# Patient Record
Sex: FEMALE | Race: WHITE | NOT HISPANIC OR LATINO | Employment: FULL TIME | URBAN - METROPOLITAN AREA
[De-identification: names, ages, dates, MRNs, and addresses within clinical notes are randomized per-mention and may not be internally consistent; named-entity substitution may affect disease eponyms.]

---

## 2023-04-13 ENCOUNTER — TELEMEDICINE (OUTPATIENT)
Dept: PRIMARY CARE | Facility: CLINIC | Age: 28
End: 2023-04-13
Payer: COMMERCIAL

## 2023-04-13 DIAGNOSIS — F90.2 ADHD (ATTENTION DEFICIT HYPERACTIVITY DISORDER), COMBINED TYPE: ICD-10-CM

## 2023-04-13 DIAGNOSIS — J31.0 CHRONIC RHINITIS: ICD-10-CM

## 2023-04-13 DIAGNOSIS — F41.1 GENERALIZED ANXIETY DISORDER: ICD-10-CM

## 2023-04-13 DIAGNOSIS — M26.609 TMJ DISEASE: ICD-10-CM

## 2023-04-13 DIAGNOSIS — N93.8 DUB (DYSFUNCTIONAL UTERINE BLEEDING): Primary | ICD-10-CM

## 2023-04-13 PROCEDURE — 99214 OFFICE O/P EST MOD 30 MIN: CPT | Performed by: FAMILY MEDICINE

## 2023-04-14 RX ORDER — PROPRANOLOL HYDROCHLORIDE 20 MG/1
20 TABLET ORAL 3 TIMES DAILY
COMMUNITY
End: 2023-04-15 | Stop reason: SDUPTHER

## 2023-04-14 RX ORDER — FLUTICASONE PROPIONATE 50 MCG
2 SPRAY, SUSPENSION (ML) NASAL DAILY
COMMUNITY
Start: 2015-06-22

## 2023-04-14 RX ORDER — CLONAZEPAM 1 MG/1
1 TABLET ORAL EVERY 12 HOURS PRN
COMMUNITY
End: 2023-04-15 | Stop reason: SDUPTHER

## 2023-04-14 RX ORDER — DEXTROAMPHETAMINE SACCHARATE, AMPHETAMINE ASPARTATE MONOHYDRATE, DEXTROAMPHETAMINE SULFATE AND AMPHETAMINE SULFATE 3.75; 3.75; 3.75; 3.75 MG/1; MG/1; MG/1; MG/1
15 CAPSULE, EXTENDED RELEASE ORAL
COMMUNITY
Start: 2023-01-23 | End: 2023-04-15 | Stop reason: ALTCHOICE

## 2023-04-14 RX ORDER — BUPROPION HYDROCHLORIDE 150 MG/1
150 TABLET ORAL
COMMUNITY
Start: 2017-07-24 | End: 2023-04-15 | Stop reason: ALTCHOICE

## 2023-04-14 RX ORDER — ETONOGESTREL AND ETHINYL ESTRADIOL .12; .015 MG/D; MG/D
1 RING VAGINAL
COMMUNITY
End: 2023-04-15 | Stop reason: SDUPTHER

## 2023-04-14 RX ORDER — NORGESTIMATE AND ETHINYL ESTRADIOL 0.25-0.035
1 KIT ORAL
COMMUNITY
Start: 2019-06-14 | End: 2023-04-15 | Stop reason: ALTCHOICE

## 2023-04-14 RX ORDER — ESCITALOPRAM OXALATE 10 MG/1
10 TABLET ORAL NIGHTLY
COMMUNITY
Start: 2023-01-23 | End: 2023-04-15 | Stop reason: ALTCHOICE

## 2023-04-14 NOTE — PROGRESS NOTES
Subjective   Patient ID: Evelina Mason is a 27 y.o. female who presents for No chief complaint on file..  HPI  See cc  Review of Systems  12 Systems have been reviewed as follows.  Constitutional: Fever, weight gain, weight loss, appetite change, night sweats, fatigue, chills.  Eyes : blurry, double vision, vision, loss, tearing, redness, pain, sensitivity to light, glaucoma.  Ears, nose, mouth, and throat: Hearing loss, ringing in the ears, ear pain, nasal congestion, nasal drainage, nosebleeds, mouth, throat, irritation tooth problem.  Cardiovascular :chest pain, pressure, heart racing, palpitations, sweating, leg swelling, high or low blood pressure  Pulmonary: Cough, yellow or green sputum, blood and sputum, shortness of breath, wheezing  Gastrointestinal: Nausea, vomiting, diarrhea, constipation, pain, blood in stool, or vomitus, heartburn, difficulty swallowing  Genitourinary: incontinence, abnormal bleeding, abnormal discharge, urinary frequency, urinary hesitancy, pain, impotence sexual problem, infection, urinary retention  Musculoskeletal: Pain, stiffness, joint, redness or warmth, arthritis, back pain, weakness, muscle wasting, sprain or fracture  Neuro: Weight weakness, dizziness, change in voice, change in taste change in vision, change in hearing, loss, or change of sensation, trouble walking, balance problems coordination problems, shaking, speech problem  Endocrine , cold or heat intolerance, blood sugar problem, weight gain or loss missed periods hot flashes, sweats, change in body hair, change in libido, increased thirst, increased urination  Heme/lymph: Swelling, bleeding, problem anemia, bruising, enlarged lymph nodes  Allergic/immunologic: H. plus nasal drip, watery itchy eyes, nasal drainage, immunosuppressed  The above were reviewed and noted negative except as noted in HPI and Problem List.    Objective   Physical Exam  Constitutional: Well developed, well nourished, alert and in no acute  distress   There were no vitals taken for this visit.  Lab Results   Component Value Date    WBC 5.4 08/02/2022    HGB 13.2 08/02/2022    HCT 39.6 08/02/2022    MCV 90 08/02/2022     08/02/2022       Assessment/Plan   Problem List Items Addressed This Visit          Musculoskeletal    TMJ disease    Relevant Orders    Follow Up In [facility] - PCP       Infectious/Inflammatory    Rhinitis    Relevant Orders    Follow Up In [facility] - PCP       Other    ADHD (attention deficit hyperactivity disorder), combined type    Relevant Orders    Follow Up In [facility] - PCP    Anxiety disorder    Relevant Medications    propranolol (Inderal) 20 mg tablet    clonazePAM (KlonoPIN) 1 mg tablet    Other Relevant Orders    Follow Up In [facility] - PCP    Follow Up In [facility] - [facility]     Other Visit Diagnoses       DUB (dysfunctional uterine bleeding)    -  Primary    Relevant Medications    EluRyng 0.12-0.015 mg/24 hr vaginal ring    Other Relevant Orders    Follow Up In [facility] - PCP        UDS next    MV every day with Fe    Patient's use of medication is allowing patient to be able to perform ADL's. Patient is always being evaluated for the possibility of lowering the medication dosage.    I have personally reviewed the OARRS report with the patient and have considered the risk of abuse, addiction, dependence and diversion.      Calcium every day    Virtual Visit - Audio and Visual Communication Real Time

## 2023-04-15 PROBLEM — M26.609 TMJ DISEASE: Status: ACTIVE | Noted: 2020-04-16

## 2023-04-15 PROBLEM — F41.9 ANXIETY DISORDER: Status: ACTIVE | Noted: 2023-04-15

## 2023-04-15 PROBLEM — L70.9 ACNE: Status: ACTIVE | Noted: 2023-04-15

## 2023-04-15 PROBLEM — J34.2 DEVIATED SEPTUM: Status: ACTIVE | Noted: 2020-04-16

## 2023-04-15 PROBLEM — Z86.69 HISTORY OF OBSTRUCTIVE SLEEP APNEA: Status: ACTIVE | Noted: 2023-04-15

## 2023-04-15 PROBLEM — J31.0 RHINITIS: Status: ACTIVE | Noted: 2020-04-16

## 2023-04-15 PROBLEM — F90.2 ADHD (ATTENTION DEFICIT HYPERACTIVITY DISORDER), COMBINED TYPE: Status: ACTIVE | Noted: 2017-07-24

## 2023-04-15 PROBLEM — D22.9 MULTIPLE NEVI: Status: ACTIVE | Noted: 2023-04-15

## 2023-04-15 PROBLEM — M25.50 ARTHRALGIA: Status: ACTIVE | Noted: 2023-04-15

## 2023-04-15 RX ORDER — PROPRANOLOL HYDROCHLORIDE 20 MG/1
20 TABLET ORAL 3 TIMES DAILY
Qty: 50 TABLET | Refills: 1 | Status: SHIPPED | OUTPATIENT
Start: 2023-04-15

## 2023-04-15 RX ORDER — ETONOGESTREL AND ETHINYL ESTRADIOL .12; .015 MG/D; MG/D
1 RING VAGINAL
Qty: 3 EACH | Refills: 1 | Status: SHIPPED | OUTPATIENT
Start: 2023-04-15

## 2023-04-15 RX ORDER — CLONAZEPAM 1 MG/1
1 TABLET ORAL EVERY 12 HOURS PRN
Qty: 20 TABLET | Refills: 0 | Status: SHIPPED | OUTPATIENT
Start: 2023-04-15 | End: 2023-05-26 | Stop reason: SDUPTHER

## 2023-05-02 ENCOUNTER — TELEPHONE (OUTPATIENT)
Dept: PRIMARY CARE | Facility: CLINIC | Age: 28
End: 2023-05-02

## 2023-05-02 NOTE — TELEPHONE ENCOUNTER
Please inform patient that her Pre-op labs will be ordered at the scheduled pre op visit. Labs will not be able to be completed more than 1 week before the scheduled procedure date.    Performing physician must order COVID swab 24-48 hours before scheduled procedure date.

## 2023-05-02 NOTE — TELEPHONE ENCOUNTER
PT HAS A PRE OP APPT 06/01 AND IS REQUESTING LAB ORDERS TO BE PUT IN PRIOR TO THIS SO SHE CAN GO GET THEM DONE. SHE WILL ALSO NEED A COVID TEST.  PLEASE INFORM PT WHEN ORDERS ARE DONE

## 2023-05-26 ENCOUNTER — OFFICE VISIT (OUTPATIENT)
Dept: PRIMARY CARE | Facility: CLINIC | Age: 28
End: 2023-05-26
Payer: COMMERCIAL

## 2023-05-26 ENCOUNTER — LAB (OUTPATIENT)
Dept: LAB | Facility: LAB | Age: 28
End: 2023-05-26
Payer: COMMERCIAL

## 2023-05-26 VITALS
BODY MASS INDEX: 21.28 KG/M2 | HEIGHT: 64 IN | DIASTOLIC BLOOD PRESSURE: 78 MMHG | OXYGEN SATURATION: 99 % | SYSTOLIC BLOOD PRESSURE: 110 MMHG | HEART RATE: 72 BPM

## 2023-05-26 DIAGNOSIS — F41.1 GENERALIZED ANXIETY DISORDER: ICD-10-CM

## 2023-05-26 DIAGNOSIS — Z00.00 ANNUAL PHYSICAL EXAM: ICD-10-CM

## 2023-05-26 DIAGNOSIS — J34.2 DEVIATED SEPTUM: ICD-10-CM

## 2023-05-26 DIAGNOSIS — N93.8 DUB (DYSFUNCTIONAL UTERINE BLEEDING): ICD-10-CM

## 2023-05-26 DIAGNOSIS — M26.609 TMJ DISEASE: ICD-10-CM

## 2023-05-26 DIAGNOSIS — Z86.69 HISTORY OF OBSTRUCTIVE SLEEP APNEA: Primary | ICD-10-CM

## 2023-05-26 DIAGNOSIS — Z01.818 PRE-OP EXAM: ICD-10-CM

## 2023-05-26 DIAGNOSIS — J31.0 CHRONIC RHINITIS: ICD-10-CM

## 2023-05-26 DIAGNOSIS — R35.0 INCREASED URINARY FREQUENCY: ICD-10-CM

## 2023-05-26 DIAGNOSIS — Z79.899 MEDICATION MANAGEMENT: ICD-10-CM

## 2023-05-26 DIAGNOSIS — F90.2 ADHD (ATTENTION DEFICIT HYPERACTIVITY DISORDER), COMBINED TYPE: ICD-10-CM

## 2023-05-26 LAB
ACTIVATED PARTIAL THROMBOPLASTIN TIME IN PPP BY COAGULATION ASSAY: 27 SEC (ref 26–39)
ALANINE AMINOTRANSFERASE (SGPT) (U/L) IN SER/PLAS: 12 U/L (ref 7–45)
ALBUMIN (G/DL) IN SER/PLAS: 4.4 G/DL (ref 3.4–5)
ALKALINE PHOSPHATASE (U/L) IN SER/PLAS: 31 U/L (ref 33–110)
ANION GAP IN SER/PLAS: 15 MMOL/L (ref 10–20)
ASPARTATE AMINOTRANSFERASE (SGOT) (U/L) IN SER/PLAS: 15 U/L (ref 9–39)
BILIRUBIN TOTAL (MG/DL) IN SER/PLAS: 0.8 MG/DL (ref 0–1.2)
CALCIDIOL (25 OH VITAMIN D3) (NG/ML) IN SER/PLAS: 32 NG/ML
CALCIUM (MG/DL) IN SER/PLAS: 9.5 MG/DL (ref 8.6–10.3)
CARBON DIOXIDE, TOTAL (MMOL/L) IN SER/PLAS: 24 MMOL/L (ref 21–32)
CHLORIDE (MMOL/L) IN SER/PLAS: 103 MMOL/L (ref 98–107)
CHOLESTEROL (MG/DL) IN SER/PLAS: 224 MG/DL (ref 0–199)
CHOLESTEROL IN HDL (MG/DL) IN SER/PLAS: 82 MG/DL
CHOLESTEROL/HDL RATIO: 2.7
CREATININE (MG/DL) IN SER/PLAS: 0.79 MG/DL (ref 0.5–1.05)
ERYTHROCYTE DISTRIBUTION WIDTH (RATIO) BY AUTOMATED COUNT: 11.8 % (ref 11.5–14.5)
ERYTHROCYTE MEAN CORPUSCULAR HEMOGLOBIN CONCENTRATION (G/DL) BY AUTOMATED: 34 G/DL (ref 32–36)
ERYTHROCYTE MEAN CORPUSCULAR VOLUME (FL) BY AUTOMATED COUNT: 89 FL (ref 80–100)
ERYTHROCYTES (10*6/UL) IN BLOOD BY AUTOMATED COUNT: 4.23 X10E12/L (ref 4–5.2)
GFR FEMALE: >90 ML/MIN/1.73M2
GLUCOSE (MG/DL) IN SER/PLAS: 72 MG/DL (ref 74–99)
HEMATOCRIT (%) IN BLOOD BY AUTOMATED COUNT: 37.7 % (ref 36–46)
HEMOGLOBIN (G/DL) IN BLOOD: 12.8 G/DL (ref 12–16)
INR IN PPP BY COAGULATION ASSAY: 1 (ref 0.9–1.1)
LDL: 126 MG/DL (ref 0–99)
LEUKOCYTES (10*3/UL) IN BLOOD BY AUTOMATED COUNT: 6.1 X10E9/L (ref 4.4–11.3)
PLATELETS (10*3/UL) IN BLOOD AUTOMATED COUNT: 255 X10E9/L (ref 150–450)
POC APPEARANCE, URINE: CLEAR
POC BILIRUBIN, URINE: NEGATIVE
POC BLOOD, URINE: NEGATIVE
POC COLOR, URINE: YELLOW
POC GLUCOSE, URINE: NEGATIVE MG/DL
POC KETONES, URINE: ABNORMAL MG/DL
POC LEUKOCYTES, URINE: NEGATIVE
POC NITRITE,URINE: NEGATIVE
POC PH, URINE: 7 PH
POC PROTEIN, URINE: NEGATIVE MG/DL
POC SPECIFIC GRAVITY, URINE: 1.02
POC UROBILINOGEN, URINE: 0.2 EU/DL
POTASSIUM (MMOL/L) IN SER/PLAS: 4.3 MMOL/L (ref 3.5–5.3)
PROTEIN TOTAL: 7 G/DL (ref 6.4–8.2)
PROTHROMBIN TIME (PT) IN PPP BY COAGULATION ASSAY: 11.3 SEC (ref 9.8–13.4)
SODIUM (MMOL/L) IN SER/PLAS: 138 MMOL/L (ref 136–145)
THYROTROPIN (MIU/L) IN SER/PLAS BY DETECTION LIMIT <= 0.05 MIU/L: 0.97 MIU/L (ref 0.44–3.98)
TRIGLYCERIDE (MG/DL) IN SER/PLAS: 82 MG/DL (ref 0–149)
UREA NITROGEN (MG/DL) IN SER/PLAS: 13 MG/DL (ref 6–23)
VLDL: 16 MG/DL (ref 0–40)

## 2023-05-26 PROCEDURE — 87086 URINE CULTURE/COLONY COUNT: CPT

## 2023-05-26 PROCEDURE — 80358 DRUG SCREENING METHADONE: CPT

## 2023-05-26 PROCEDURE — 85610 PROTHROMBIN TIME: CPT

## 2023-05-26 PROCEDURE — 85027 COMPLETE CBC AUTOMATED: CPT

## 2023-05-26 PROCEDURE — 82306 VITAMIN D 25 HYDROXY: CPT

## 2023-05-26 PROCEDURE — 81002 URINALYSIS NONAUTO W/O SCOPE: CPT | Performed by: FAMILY MEDICINE

## 2023-05-26 PROCEDURE — 36415 COLL VENOUS BLD VENIPUNCTURE: CPT

## 2023-05-26 PROCEDURE — 80373 DRUG SCREENING TRAMADOL: CPT

## 2023-05-26 PROCEDURE — 80324 DRUG SCREEN AMPHETAMINES 1/2: CPT

## 2023-05-26 PROCEDURE — 80346 BENZODIAZEPINES1-12: CPT

## 2023-05-26 PROCEDURE — 87081 CULTURE SCREEN ONLY: CPT

## 2023-05-26 PROCEDURE — 84443 ASSAY THYROID STIM HORMONE: CPT

## 2023-05-26 PROCEDURE — 80368 SEDATIVE HYPNOTICS: CPT

## 2023-05-26 PROCEDURE — 80061 LIPID PANEL: CPT

## 2023-05-26 PROCEDURE — 80361 OPIATES 1 OR MORE: CPT

## 2023-05-26 PROCEDURE — 80365 DRUG SCREENING OXYCODONE: CPT

## 2023-05-26 PROCEDURE — 80354 DRUG SCREENING FENTANYL: CPT

## 2023-05-26 PROCEDURE — 80053 COMPREHEN METABOLIC PANEL: CPT

## 2023-05-26 PROCEDURE — 85730 THROMBOPLASTIN TIME PARTIAL: CPT

## 2023-05-26 PROCEDURE — 80307 DRUG TEST PRSMV CHEM ANLYZR: CPT

## 2023-05-26 PROCEDURE — 99214 OFFICE O/P EST MOD 30 MIN: CPT | Performed by: FAMILY MEDICINE

## 2023-05-26 RX ORDER — CLONAZEPAM 1 MG/1
1 TABLET ORAL EVERY 12 HOURS PRN
Qty: 20 TABLET | Refills: 0 | Status: SHIPPED | OUTPATIENT
Start: 2023-05-26

## 2023-05-26 RX ORDER — DEXTROAMPHETAMINE SACCHARATE, AMPHETAMINE ASPARTATE MONOHYDRATE, DEXTROAMPHETAMINE SULFATE AND AMPHETAMINE SULFATE 3.75; 3.75; 3.75; 3.75 MG/1; MG/1; MG/1; MG/1
15 CAPSULE, EXTENDED RELEASE ORAL EVERY MORNING
COMMUNITY

## 2023-05-26 ASSESSMENT — ENCOUNTER SYMPTOMS
GASTROINTESTINAL NEGATIVE: 1
MUSCULOSKELETAL NEGATIVE: 1
CONSTITUTIONAL NEGATIVE: 1
RESPIRATORY NEGATIVE: 1
PSYCHIATRIC NEGATIVE: 1
ALLERGIC/IMMUNOLOGIC NEGATIVE: 1
CARDIOVASCULAR NEGATIVE: 1
HEMATOLOGIC/LYMPHATIC NEGATIVE: 1
NEUROLOGICAL NEGATIVE: 1
EYES NEGATIVE: 1
ENDOCRINE NEGATIVE: 1

## 2023-05-26 NOTE — PROGRESS NOTES
"Subjective   Patient ID: Evelina Mason is a 27 y.o. female who presents for a follow up on deviated septum and anxiety.     HPI Pt getting septoplasty. Pt is hoping to get in next Saturday but if not will be getting it Aug 23rd. Pt states that it is the same surgeon that did her jaw surgery.     Pt seeing psychiatry and is getting adderall ER 15mg from them.     Pt would like a refill on klonopin. It is working well for her. UDS left today      Review of Systems   Constitutional: Negative.    HENT: Negative.     Eyes: Negative.    Respiratory: Negative.     Cardiovascular: Negative.    Gastrointestinal: Negative.    Endocrine: Negative.    Genitourinary: Negative.    Musculoskeletal: Negative.    Skin: Negative.    Allergic/Immunologic: Negative.    Neurological: Negative.    Hematological: Negative.    Psychiatric/Behavioral: Negative.         Objective   /78   Pulse 72   Ht 1.626 m (5' 4\")   SpO2 99%   BMI 21.28 kg/m²     Physical Exam   Constitutional: Well developed, well nourished, alert and in no acute distress   Eyes: Normal external exam. Pupils equally round and reactive to light with normal accommodation and extraocular movements intact.  Neck: Supple, no lymphadenopathy or masses.   Cardiovascular: Regular rate and rhythm, normal S1 and S2, no murmurs, gallops, or rubs. Radial pulses normal. No peripheral edema.  Pulmonary: No respiratory distress, lungs clear to auscultation bilaterally. No wheezes, rhonchi, rales.  Abdomen: soft,non tender, non distended, without masses or HSM  Skin: Warm, well perfused, normal skin turgor and color.   Neurologic: Cranial nerves II-XII grossly intact.   Psychiatric: Mood calm and affect normal  Musculoskeletal: Moving all extremities without restriction    Assessment/Plan   Problem List Items Addressed This Visit          Musculoskeletal    TMJ disease    Relevant Orders    Follow Up In [facility] - PCP       Infectious/Inflammatory    Rhinitis "    Relevant Orders    Follow Up In [facility] - PCP       Other    ADHD (attention deficit hyperactivity disorder), combined type    Relevant Orders    Follow Up In [facility] - PCP    Anxiety disorder    Relevant Medications    clonazePAM (KlonoPIN) 1 mg tablet    Other Relevant Orders    Follow Up In [facility] - PCP    Deviated septum    Relevant Orders    Follow Up In [facility] - PCP    History of obstructive sleep apnea - Primary     Other Visit Diagnoses       DUB (dysfunctional uterine bleeding)        Relevant Orders    Follow Up In [facility] - PCP    Annual physical exam        Relevant Orders    CBC (Completed)    Comprehensive Metabolic Panel (Completed)    Lipid Panel (Completed)    Thyroid Stimulating Hormone (Completed)    Vitamin D 25-Hydroxy,Total (Completed)    Coagulation Screen (Completed)    Follow Up In [facility] - PCP    POCT UA (nonautomated) manually resulted (Completed)    Pre-op exam        Relevant Orders    Staphylococcus/MRSA Colonization, Culture (Completed)    Medication management        Relevant Orders    Opiate/Opioid/Benzo Extended Prescription Compliance (Completed)    Amphetamine Confirm, Urine (Completed)    Increased urinary frequency        Relevant Orders    Urine Culture (Completed)          Scribe Attestation  By signing my name below, I, Pete Carson MD   , Scribe   attest that this documentation has been prepared under the direction and in the presence of Pete Carson MD.    Provider Attestation - Scribe documentation    All medical record entries made by the Scribe were at my direction and personally dictated by me. I have reviewed the chart and agree that the record accurately reflects my personal performance of the history, physical exam, discussion and plan.    Calcium every day       UDS today      MV every day with Fe    I have personally reviewed the OARRS report with the patient and  have considered the risk of abuse, addiction, dependence and diversion.    Order sleep study in future    Patient is having septoplasty with  Dr. Ku possibly 6/2 if not will be on 8/23     Fax results to [phone]    Medically cleared    [facility] repeat lipid panel 3 months

## 2023-05-28 LAB
STAPH/MRSA SCREEN, CULTURE: NORMAL
URINE CULTURE: NORMAL

## 2023-06-01 ENCOUNTER — APPOINTMENT (OUTPATIENT)
Dept: PRIMARY CARE | Facility: CLINIC | Age: 28
End: 2023-06-01

## 2023-06-01 LAB
6-ACETYLMORPHINE: <25 NG/ML
7-AMINOCLONAZEPAM: 107 NG/ML
ALPHA-HYDROXYALPRAZOLAM: <25 NG/ML
ALPHA-HYDROXYMIDAZOLAM: <25 NG/ML
ALPRAZOLAM: <25 NG/ML
AMPHETAMINE (PRESENCE) IN URINE BY SCREEN METHOD: ABNORMAL
BARBITURATES PRESENCE IN URINE BY SCREEN METHOD: ABNORMAL
CANNABINOIDS IN URINE BY SCREEN METHOD: ABNORMAL
CHLORDIAZEPOXIDE: <25 NG/ML
CLONAZEPAM: <25 NG/ML
COCAINE (PRESENCE) IN URINE BY SCREEN METHOD: ABNORMAL
CODEINE: <50 NG/ML
CREATINE, URINE FOR DRUG: 129.4 MG/DL
DIAZEPAM: <25 NG/ML
DRUG SCREEN COMMENT URINE: ABNORMAL
EDDP: <25 NG/ML
FENTANYL CONFIRMATION, URINE: <2.5 NG/ML
HYDROCODONE: <25 NG/ML
HYDROMORPHONE: <25 NG/ML
LORAZEPAM: <25 NG/ML
METHADONE CONFIRMATION,URINE: <25 NG/ML
MIDAZOLAM: <25 NG/ML
MORPHINE URINE: <50 NG/ML
NORDIAZEPAM: <25 NG/ML
NORFENTANYL: <2.5 NG/ML
NORHYDROCODONE: <25 NG/ML
NOROXYCODONE: <25 NG/ML
O-DESMETHYLTRAMADOL: <50 NG/ML
OXAZEPAM: <25 NG/ML
OXYCODONE: <25 NG/ML
OXYMORPHONE: <25 NG/ML
PHENCYCLIDINE (PRESENCE) IN URINE BY SCREEN METHOD: ABNORMAL
TEMAZEPAM: <25 NG/ML
TRAMADOL: <50 NG/ML
ZOLPIDEM METABOLITE (ZCA): <25 NG/ML
ZOLPIDEM: <25 NG/ML

## 2023-06-03 LAB
AMPHETAMINES,URINE: 1372 NG/ML
MDA,URINE: <200 NG/ML
MDEA,URINE: <200 NG/ML
MDMA,UR: <200 NG/ML
METHAMPHETAMINE QUANTITATIVE URINE: <200 NG/ML
PHENTERMINE,UR: <200 NG/ML

## 2023-07-13 ENCOUNTER — TELEMEDICINE (OUTPATIENT)
Dept: PRIMARY CARE | Facility: CLINIC | Age: 28
End: 2023-07-13
Payer: COMMERCIAL

## 2023-07-13 DIAGNOSIS — L01.00 IMPETIGO: Primary | ICD-10-CM

## 2023-07-13 PROCEDURE — 99213 OFFICE O/P EST LOW 20 MIN: CPT | Performed by: NURSE PRACTITIONER

## 2023-07-13 RX ORDER — MUPIROCIN 20 MG/G
OINTMENT TOPICAL 3 TIMES DAILY
Qty: 22 G | Refills: 0 | Status: SHIPPED | OUTPATIENT
Start: 2023-07-13 | End: 2023-07-23

## 2023-07-13 ASSESSMENT — ENCOUNTER SYMPTOMS
MYALGIAS: 0
CHILLS: 0
FEVER: 0
ARTHRALGIAS: 0

## 2023-07-13 NOTE — PATIENT INSTRUCTIONS
Hx and exam are c/w bacterial skin infection, will treat.   We talked about the typical course as well as symptoms that need re-evaluation in person - ie increased redness, swelling, pain, fullness, etc.  Use topical med as directed.

## 2023-07-13 NOTE — PROGRESS NOTES
As noted, pulled hair from face, now crusty and red with scant serous drainage.  Pictures reviewed.Subjective   Patient ID: Evelina Mason is a 27 y.o. female who presents for skin issue.  See ml  Feels well otherwise        Review of Systems   Constitutional:  Negative for chills and fever.   Musculoskeletal:  Negative for arthralgias and myalgias.   Skin:         Per CC       Objective   Physical Exam  Constitutional:       Appearance: Normal appearance.   Musculoskeletal:      Cervical back: Neck supple.   Skin:     Comments: Birthmark right lateral chin contains area of redness, crusty drainage   Neurological:      Mental Status: She is alert.         Assessment/Plan

## 2023-11-07 ENCOUNTER — HOSPITAL ENCOUNTER (OUTPATIENT)
Dept: RADIOLOGY | Facility: HOSPITAL | Age: 28
Discharge: HOME | End: 2023-11-07
Payer: COMMERCIAL

## 2023-11-07 ENCOUNTER — OFFICE VISIT (OUTPATIENT)
Dept: ORTHOPEDIC SURGERY | Facility: HOSPITAL | Age: 28
End: 2023-11-07
Payer: COMMERCIAL

## 2023-11-07 VITALS — BODY MASS INDEX: 18.78 KG/M2 | HEIGHT: 64 IN | WEIGHT: 110 LBS

## 2023-11-07 DIAGNOSIS — M25.562 PAIN IN BOTH KNEES, UNSPECIFIED CHRONICITY: ICD-10-CM

## 2023-11-07 DIAGNOSIS — M25.561 PAIN IN BOTH KNEES, UNSPECIFIED CHRONICITY: ICD-10-CM

## 2023-11-07 DIAGNOSIS — S83.241A ACUTE MEDIAL MENISCUS TEAR OF RIGHT KNEE, INITIAL ENCOUNTER: Primary | ICD-10-CM

## 2023-11-07 PROCEDURE — 73564 X-RAY EXAM KNEE 4 OR MORE: CPT | Mod: RIGHT SIDE | Performed by: RADIOLOGY

## 2023-11-07 PROCEDURE — 99213 OFFICE O/P EST LOW 20 MIN: CPT | Mod: GC,25 | Performed by: STUDENT IN AN ORGANIZED HEALTH CARE EDUCATION/TRAINING PROGRAM

## 2023-11-07 PROCEDURE — 1036F TOBACCO NON-USER: CPT | Performed by: STUDENT IN AN ORGANIZED HEALTH CARE EDUCATION/TRAINING PROGRAM

## 2023-11-07 PROCEDURE — 99203 OFFICE O/P NEW LOW 30 MIN: CPT | Performed by: STUDENT IN AN ORGANIZED HEALTH CARE EDUCATION/TRAINING PROGRAM

## 2023-11-07 PROCEDURE — 73564 X-RAY EXAM KNEE 4 OR MORE: CPT | Mod: RT

## 2023-11-07 ASSESSMENT — ENCOUNTER SYMPTOMS
ARTHRALGIAS: 1
FATIGUE: 0
BACK PAIN: 0
WEAKNESS: 0
NUMBNESS: 0
ACTIVITY CHANGE: 0
FEVER: 0
JOINT SWELLING: 0
COLOR CHANGE: 1

## 2023-11-07 ASSESSMENT — PAIN DESCRIPTION - DESCRIPTORS: DESCRIPTORS: THROBBING;PRESSURE;ACHING

## 2023-11-07 ASSESSMENT — PAIN - FUNCTIONAL ASSESSMENT: PAIN_FUNCTIONAL_ASSESSMENT: 0-10

## 2023-11-07 ASSESSMENT — PAIN SCALES - GENERAL: PAINLEVEL_OUTOF10: 8

## 2023-11-07 NOTE — PROGRESS NOTES
REFERRAL SOURCE: No ref. provider found     CHIEF COMPLAINT: right knee pain    HISTORY OF PRESENT ILLNESS  Evelina Mason is a very pleasant 28 y.o. female with history of ADHD, Anxiety, and TSERING who is here for evaluation of right knee pain.    11/7/23: On the morning of November 6, 2023 she was out walking her dog when it bolted after a squirrel causing her to fall to the ground.  She fell directly on her knee does not remember if the knee twisted or not as it happened so fast.  She immediately felt pain along with a pop.  She was able to get up and walk on it but could not fully extend the knee.  She tried ice and heat at home which did not really help and the knee swelled had a bruise formed.  She feels like the knee is unstable when she walks around.  Pain is located anteriorly and feels very sharp.  She rates her pain a 4 out of 10 constantly with flares up to 10 out of 10.    MEDS    Current Outpatient Medications:     amphetamine-dextroamphetamine XR (Adderall XR) 15 mg 24 hr capsule, Take 1 capsule (15 mg) by mouth once daily in the morning. Do not crush or chew., Disp: , Rfl:     clonazePAM (KlonoPIN) 1 mg tablet, Take 1 tablet (1 mg) by mouth every 12 hours if needed for anxiety., Disp: 20 tablet, Rfl: 0    EluRyng 0.12-0.015 mg/24 hr vaginal ring, Insert 1 each into the vagina every 28 (twenty-eight) days., Disp: 3 each, Rfl: 1    fluticasone (Flonase) 50 mcg/actuation nasal spray, Administer 2 sprays into each nostril once daily., Disp: , Rfl:     propranolol (Inderal) 20 mg tablet, Take 1 tablet (20 mg) by mouth in the morning and 1 tablet (20 mg) in the evening and 1 tablet (20 mg) before bedtime., Disp: 50 tablet, Rfl: 1    ALLERGIES  No Known Allergies    PAST MEDICAL HISTORY  Past Medical History:   Diagnosis Date    Arthralgia of temporomandibular joint, unspecified side     TMJ syndrome       PAST SURGICAL HISTORY  Past Surgical History:   Procedure Laterality Date    OTHER SURGICAL HISTORY   05/21/2021    No history of surgery       SOCIAL HISTORY   Social History     Socioeconomic History    Marital status: Single     Spouse name: Not on file    Number of children: Not on file    Years of education: Not on file    Highest education level: Not on file   Occupational History    Not on file   Tobacco Use    Smoking status: Unknown    Smokeless tobacco: Not on file   Substance and Sexual Activity    Alcohol use: Not on file    Drug use: Not on file    Sexual activity: Not on file   Other Topics Concern    Not on file   Social History Narrative    Not on file     Social Determinants of Health     Financial Resource Strain: Not on file   Food Insecurity: Not on file   Transportation Needs: Not on file   Physical Activity: Not on file   Stress: Not on file   Social Connections: Not on file   Intimate Partner Violence: Not on file   Housing Stability: Not on file       FAMILY HISTORY  No family history on file.    REVIEW OF SYSTEMS  Except for those mentioned in the history of present illness, and below, a complete review of systems is negative.     Review of Systems   Constitutional:  Negative for activity change, fatigue and fever.   Musculoskeletal:  Positive for arthralgias (right knee pian). Negative for back pain and joint swelling.   Skin:  Positive for color change (ecchymosis over medial knee). Negative for rash.   Neurological:  Negative for weakness and numbness.       VITALS  There were no vitals filed for this visit.    PHYSICAL EXAMINATION   GENERAL:  Awake, alert, and oriented, no apparent distress, pleasant, and cooperative  PSYC: Mood is euthymic, affect is congruent  EAR, NOSE, THROAT:  Normocephalic, atraumatic, moist membranes, anicteric sclera  LUNG: Nonlabored breathing  HEART: No clubbing or cyanosis  SKIN: No increased erythema, warmth, rashes, or concerning skin lesions  NEURO: Sensation is intact in the bilateral lower extremities. Strength is grossly 5 out of 5 throughout the  bilateral lower extremities, unless noted below.  GAIT: Antalgic; not able to fully extend the knee.  MUSCULOSKELETAL: Examination of the right knee: Range of motion is 5-120. Mild effusion. No patellar apprehension. TTP to medial and lateral joint line, medial tibial plateau, medial patellar facet, quad tendon, and patellar tendon. Varus and valgus stress test are negative. Lachman's is negative. Posterior drawer is negative. Kolton's and meniscal grind tests are positive.     IMAGING STUDIES: Radiographs the right knee dated 11/7/2023 were personally reviewed and interpreted by me, Dr. Anjali Christopher, and the findings shared with the patient.  No evidence of fracture.  There is a suprapatellar effusion.     IMPRESSION  #1  Acute right knee pain and swelling with positive Kolton's concerning for meniscal pathology    PLAN  The following was discussed with the patient:     Evelina Mason is a very pleasant 28 y.o. female  with history of ADHD, Anxiety, and TSERING who is here for evaluation of right knee pain.  She has positive Kolton's concerning for meniscal pathology.  She also has tenderness palpation over the medial joint line and medial tibial plateau, which could be related to occult fracture given her mechanism of a fall.  She also has pain over the medial hamstring tendons, which are likely overcompensating following the initial injury.  -We discussed the above.  -We will obtain an MRI to rule out meniscal injury given that she has instability and inability to fully extend her knee.  -She can ambulate as tolerated and should utilize crutches if pain is worsening.  -Can take Tylenol over-the-counter and utilize ice.  -Follow-up after the MRI of the right knee.    The patient was counseled to remain active, but avoid activities that worsen symptoms. The patient was in agreement with this plan. All questions were answered to the best of my ability.    PATIENT EDUCATION:  Education was discussed at  today's appointment. A learning needs assessment was performed.    Primary learner: Evelina Mason  Barriers to learning: None  Preferred language: English  Learning preferences include: Seeing and doing.  Discussed: Diagnosis and treatment plan.  Demonstrated: Understanding of material discussed.  Patient education materials given: None.  Learner response: Learner demonstrated understanding.    This note was dictated using Dragon speech recognition software and was not corrected for spelling or grammatical errors.      Patient seen and examined with PM&R resident, Dr. Butler. History, physical examination, pertinent imaging findings and the plan of care were discussed and I performed the key portions of the history, physical examination, and discussion of the plan of care. I have edited his note and agree with the findings.      Anjali Christopher MD    [facility] [place]   and [facility]

## 2023-11-15 ENCOUNTER — HOSPITAL ENCOUNTER (OUTPATIENT)
Dept: RADIOLOGY | Facility: HOSPITAL | Age: 28
Discharge: HOME | End: 2023-11-15
Payer: COMMERCIAL

## 2023-11-15 ENCOUNTER — APPOINTMENT (OUTPATIENT)
Dept: RADIOLOGY | Facility: HOSPITAL | Age: 28
End: 2023-11-15
Payer: COMMERCIAL

## 2023-11-15 DIAGNOSIS — S83.241A ACUTE MEDIAL MENISCUS TEAR OF RIGHT KNEE, INITIAL ENCOUNTER: ICD-10-CM

## 2023-11-15 PROCEDURE — 73721 MRI JNT OF LWR EXTRE W/O DYE: CPT | Mod: RIGHT SIDE | Performed by: RADIOLOGY

## 2023-11-15 PROCEDURE — 73721 MRI JNT OF LWR EXTRE W/O DYE: CPT | Mod: RT

## 2023-12-18 ENCOUNTER — APPOINTMENT (OUTPATIENT)
Dept: ORTHOPEDIC SURGERY | Facility: CLINIC | Age: 28
End: 2023-12-18

## 2023-12-21 ENCOUNTER — OFFICE VISIT (OUTPATIENT)
Dept: ORTHOPEDIC SURGERY | Facility: CLINIC | Age: 28
End: 2023-12-21
Payer: COMMERCIAL

## 2023-12-21 DIAGNOSIS — T14.8XXA BONE BRUISE: Primary | ICD-10-CM

## 2023-12-21 DIAGNOSIS — M22.2X1 PATELLOFEMORAL PAIN SYNDROME OF RIGHT KNEE: ICD-10-CM

## 2023-12-21 PROCEDURE — 1036F TOBACCO NON-USER: CPT | Performed by: STUDENT IN AN ORGANIZED HEALTH CARE EDUCATION/TRAINING PROGRAM

## 2023-12-21 PROCEDURE — 99213 OFFICE O/P EST LOW 20 MIN: CPT | Performed by: STUDENT IN AN ORGANIZED HEALTH CARE EDUCATION/TRAINING PROGRAM

## 2023-12-21 ASSESSMENT — ENCOUNTER SYMPTOMS
COLOR CHANGE: 0
FEVER: 0
ARTHRALGIAS: 1
ACTIVITY CHANGE: 0
FATIGUE: 0
JOINT SWELLING: 0
BACK PAIN: 0
NUMBNESS: 0
WEAKNESS: 0

## 2023-12-21 NOTE — PROGRESS NOTES
REFERRAL SOURCE: No ref. provider found     CHIEF COMPLAINT: right knee pain    HISTORY OF PRESENT ILLNESS  Evelina Mason is a very pleasant 28 y.o. female with history of ADHD, Anxiety, and TSERING who is here for evaluation of right knee pain.    Previous History:  11/7/23: On the morning of November 6, 2023 she was out walking her dog when it bolted after a squirrel causing her to fall to the ground.  She fell directly on her knee does not remember if the knee twisted or not as it happened so fast.  She immediately felt pain along with a pop.  She was able to get up and walk on it but could not fully extend the knee.  She tried ice and heat at home which did not really help and the knee swelled had a bruise formed.  She feels like the knee is unstable when she walks around.  Pain is located anteriorly and feels very sharp.  She rates her pain a 4 out of 10 constantly with flares up to 10 out of 10.    Interval History:  12/21/2023: Since the last visit, her pain has improved slightly but still has pain around her right knee anteriorly. This is worse when she flexes her knee and also has to kneel down. She has been trying to rest her knee when she can and avoid activities that would make it worse. We discussed her MRI today.     MEDS    Current Outpatient Medications:     amphetamine-dextroamphetamine XR (Adderall XR) 15 mg 24 hr capsule, Take 1 capsule (15 mg) by mouth once daily in the morning. Do not crush or chew., Disp: , Rfl:     clonazePAM (KlonoPIN) 1 mg tablet, Take 1 tablet (1 mg) by mouth every 12 hours if needed for anxiety., Disp: 20 tablet, Rfl: 0    EluRyng 0.12-0.015 mg/24 hr vaginal ring, Insert 1 each into the vagina every 28 (twenty-eight) days., Disp: 3 each, Rfl: 1    fluticasone (Flonase) 50 mcg/actuation nasal spray, Administer 2 sprays into each nostril once daily., Disp: , Rfl:     propranolol (Inderal) 20 mg tablet, Take 1 tablet (20 mg) by mouth in the morning and 1 tablet (20 mg) in the  evening and 1 tablet (20 mg) before bedtime. (Patient not taking: Reported on 11/7/2023), Disp: 50 tablet, Rfl: 1    ALLERGIES  No Known Allergies    PAST MEDICAL HISTORY  Past Medical History:   Diagnosis Date    Arthralgia of temporomandibular joint, unspecified side     TMJ syndrome       PAST SURGICAL HISTORY  Past Surgical History:   Procedure Laterality Date    OTHER SURGICAL HISTORY  05/21/2021    No history of surgery       SOCIAL HISTORY   Social History     Socioeconomic History    Marital status: Single     Spouse name: Not on file    Number of children: Not on file    Years of education: Not on file    Highest education level: Not on file   Occupational History    Not on file   Tobacco Use    Smoking status: Never    Smokeless tobacco: Never   Substance and Sexual Activity    Alcohol use: Yes     Alcohol/week: 2.0 standard drinks of alcohol     Types: 2 Glasses of wine per week     Comment: social    Drug use: Never    Sexual activity: Not on file   Other Topics Concern    Not on file   Social History Narrative    Not on file     Social Determinants of Health     Financial Resource Strain: Not on file   Food Insecurity: Not on file   Transportation Needs: Not on file   Physical Activity: Not on file   Stress: Not on file   Social Connections: Not on file   Intimate Partner Violence: Not on file   Housing Stability: Not on file       FAMILY HISTORY  Mother has Rheumatoid Arthritis and osteoporosis    REVIEW OF SYSTEMS  Except for those mentioned in the history of present illness, and below, a complete review of systems is negative.     Review of Systems   Constitutional:  Negative for activity change, fatigue and fever.   Musculoskeletal:  Positive for arthralgias (right knee pian). Negative for back pain and joint swelling.   Skin:  Negative for color change (ecchymosis over medial knee) and rash.   Neurological:  Negative for weakness and numbness.       VITALS  There were no vitals filed for this  visit.    PHYSICAL EXAMINATION   GENERAL:  Awake, alert, and oriented, no apparent distress, pleasant, and cooperative  PSYC: Mood is euthymic, affect is congruent  EAR, NOSE, THROAT:  Normocephalic, atraumatic, moist membranes, anicteric sclera  LUNG: Nonlabored breathing  HEART: No clubbing or cyanosis  SKIN: No increased erythema, warmth, rashes, or concerning skin lesions  NEURO: Sensation is intact in the bilateral lower extremities. Strength is grossly 5 out of 5 throughout the bilateral lower extremities, unless noted below.  GAIT: Antalgic; not able to fully extend the knee.  MUSCULOSKELETAL: Examination of the right knee: Range of motion is 5-120. Mild effusion. No patellar apprehension. TTP to medial, medial tibial plateau, medial patellar facet, quad tendon, and patellar tendon. Varus and valgus stress test are negative. Lachman's is negative. Posterior drawer is negative. Kolton's and meniscal grind tests are negative.    IMAGING STUDIES:   MRI of the right knee on November 15, 2023 were personally reviewed and interpreted by me, Dr. Anjali Christopher, and the findings shared with the patient. This demonstrated an osseous contusion of the medial femoral condyle and no findings of meniscal or ligamentous injury.    Radiographs the right knee dated 11/7/2023 were personally reviewed and interpreted by me, Dr. Anjali Christopher, and the findings shared with the patient.  No evidence of fracture.  There is a suprapatellar effusion.     IMPRESSION  #1  Osseous contusion of the right medial femoral condyle  #2  Patellofemoral pain syndrome    PLAN  The following was discussed with the patient:     Evelina Mason is a very pleasant 28 y.o. female  with history of ADHD, Anxiety, and TSERING who is here for evaluation of right knee pain.  MRI showed an osseous contusion of the right medial femoral condyle. This is also complicated by patellofemoral pain.  -We discussed the above.  -Referral to PT to work on  knee, hip, and core strengthening and flexibility in her knee and hip  -She can do activities as tolerated   -Ordered CBC, CMP, CRP, ESR, ALEXANDR, HLA B27 for family history of rheumatoid arthritis and her intermittent joint swelling. If positive, we will refer to rheumatology.  -Ordered a DEXA scan to evaluate bone density given family history of osteoporosis and her development of a bone bruise easily. If she has low bone density, we will refer to rheumatology.  -Can take Tylenol over-the-counter and utilize ice.  -Follow-up after obtaining labs and DEXA scan.     The patient was counseled to remain active, but avoid activities that worsen symptoms. The patient was in agreement with this plan. All questions were answered to the best of my ability.    PATIENT EDUCATION:  Education was discussed at today's appointment. A learning needs assessment was performed.    Primary learner: Evelina Mason  Barriers to learning: None  Preferred language: English  Learning preferences include: Seeing and doing.  Discussed: Diagnosis and treatment plan.  Demonstrated: Understanding of material discussed.  Patient education materials given: None.  Learner response: Learner demonstrated understanding.    This note was dictated using Dragon speech recognition software and was not corrected for spelling or grammatical errors.    Patient seen and examined with PM&R resident, Dr. Butler. History, physical examination, pertinent imaging findings and the plan of care were discussed and I performed the key portions of the history, physical examination, and discussion of the plan of care. I have edited his note and agree with the findings.      Anjali Christopher MD    [facility] [place]   and [facility]

## 2024-01-30 ENCOUNTER — OFFICE VISIT (OUTPATIENT)
Dept: URGENT CARE | Facility: CLINIC | Age: 29
End: 2024-01-30
Payer: COMMERCIAL

## 2024-01-30 VITALS
WEIGHT: 108 LBS | SYSTOLIC BLOOD PRESSURE: 118 MMHG | BODY MASS INDEX: 18.54 KG/M2 | HEART RATE: 72 BPM | DIASTOLIC BLOOD PRESSURE: 81 MMHG | OXYGEN SATURATION: 96 % | TEMPERATURE: 98.2 F

## 2024-01-30 DIAGNOSIS — J32.9 SINOBRONCHITIS: Primary | ICD-10-CM

## 2024-01-30 DIAGNOSIS — J40 SINOBRONCHITIS: Primary | ICD-10-CM

## 2024-01-30 PROCEDURE — 1036F TOBACCO NON-USER: CPT | Performed by: PHYSICIAN ASSISTANT

## 2024-01-30 PROCEDURE — 99204 OFFICE O/P NEW MOD 45 MIN: CPT | Performed by: PHYSICIAN ASSISTANT

## 2024-01-30 RX ORDER — ALBUTEROL SULFATE 90 UG/1
2 AEROSOL, METERED RESPIRATORY (INHALATION) EVERY 6 HOURS PRN
Qty: 18 G | Refills: 0 | Status: SHIPPED | OUTPATIENT
Start: 2024-01-30 | End: 2025-01-29

## 2024-01-30 RX ORDER — DOXYCYCLINE 100 MG/1
100 CAPSULE ORAL 2 TIMES DAILY
Qty: 20 CAPSULE | Refills: 0 | Status: SHIPPED | OUTPATIENT
Start: 2024-01-30 | End: 2024-02-09

## 2024-01-30 RX ORDER — PROMETHAZINE HYDROCHLORIDE AND DEXTROMETHORPHAN HYDROBROMIDE 6.25; 15 MG/5ML; MG/5ML
5 SYRUP ORAL 4 TIMES DAILY PRN
Qty: 120 ML | Refills: 0 | Status: SHIPPED | OUTPATIENT
Start: 2024-01-30 | End: 2024-02-06

## 2024-01-30 ASSESSMENT — ENCOUNTER SYMPTOMS
FEVER: 0
MUSCULOSKELETAL NEGATIVE: 1
ENDOCRINE NEGATIVE: 1
ALLERGIC/IMMUNOLOGIC NEGATIVE: 1
NEUROLOGICAL NEGATIVE: 1
GASTROINTESTINAL NEGATIVE: 1
HEMATOLOGIC/LYMPHATIC NEGATIVE: 1
PSYCHIATRIC NEGATIVE: 1
SORE THROAT: 0
EYES NEGATIVE: 1
CARDIOVASCULAR NEGATIVE: 1
COUGH: 1
SHORTNESS OF BREATH: 1

## 2024-01-30 ASSESSMENT — PAIN SCALES - GENERAL: PAINLEVEL: 4

## 2024-01-30 NOTE — PATIENT INSTRUCTIONS
Fluids and rest  Pcp follow up this week if not improving or worsening  ER visit anytime 24/7 for acute worsening or changing condition

## 2024-01-30 NOTE — PROGRESS NOTES
Subjective   Patient ID: Evelina Mason is a 28 y.o. female.      History provided by:  Patient   used: No    Cough  Associated symptoms include ear pain and shortness of breath. Pertinent negatives include no fever or sore throat.   This is a 28 yr old female here for respiratory sxs x 1 month. Occasionally productive cough of clear sputum, clear nasal congestion, dyspnea, and right ear pain. No sore throat or fever.     Review of Systems   Constitutional:  Negative for fever.   HENT:  Positive for congestion and ear pain. Negative for sore throat.    Eyes: Negative.    Respiratory:  Positive for cough and shortness of breath.    Cardiovascular: Negative.    Gastrointestinal: Negative.    Endocrine: Negative.    Genitourinary: Negative.    Musculoskeletal: Negative.    Skin: Negative.    Allergic/Immunologic: Negative.    Neurological: Negative.    Hematological: Negative.    Psychiatric/Behavioral: Negative.     All other systems reviewed and are negative.  /81   Pulse 72   Temp 36.8 °C (98.2 °F)   Wt 49 kg (108 lb)   LMP 01/17/2024 (Approximate)   SpO2 96%   BMI 18.54 kg/m²     Objective   Physical Exam  Vitals and nursing note reviewed.   Constitutional:       Appearance: Normal appearance.   HENT:      Head: Normocephalic and atraumatic.      Right Ear: Tympanic membrane and ear canal normal.      Left Ear: Tympanic membrane and ear canal normal.      Mouth/Throat:      Mouth: Mucous membranes are moist.      Pharynx: Oropharynx is clear.   Cardiovascular:      Rate and Rhythm: Normal rate and regular rhythm.   Pulmonary:      Effort: Pulmonary effort is normal.      Breath sounds: Normal breath sounds.   Musculoskeletal:      Cervical back: Neck supple.   Lymphadenopathy:      Cervical: No cervical adenopathy.   Skin:     General: Skin is warm and dry.   Neurological:      General: No focal deficit present.      Mental Status: She is alert and oriented to person, place, and  time.   Psychiatric:         Mood and Affect: Mood normal.         Behavior: Behavior normal.     Assessment:  Sinobronchitis    Plan:  Doxycycline 100 mg bid x 10 days  Phenergan DM syrup qid  Albuterol MDI as directed  Increase clear fluids  Pcp follow up this week if not improving or worsening  ER visit anytime 24/7 for acute worsening or changing condition

## 2024-04-26 ENCOUNTER — HOSPITAL ENCOUNTER (OUTPATIENT)
Dept: RADIOLOGY | Facility: CLINIC | Age: 29
Discharge: HOME | End: 2024-04-26
Payer: COMMERCIAL

## 2024-04-26 DIAGNOSIS — T14.8XXA BONE BRUISE: ICD-10-CM

## 2024-04-26 PROCEDURE — 77080 DXA BONE DENSITY AXIAL: CPT | Performed by: RADIOLOGY

## 2024-04-26 PROCEDURE — 77080 DXA BONE DENSITY AXIAL: CPT

## 2024-05-20 ENCOUNTER — APPOINTMENT (OUTPATIENT)
Dept: PRIMARY CARE | Facility: CLINIC | Age: 29
End: 2024-05-20
Payer: COMMERCIAL

## 2024-05-30 ENCOUNTER — OFFICE VISIT (OUTPATIENT)
Dept: ORTHOPEDIC SURGERY | Facility: CLINIC | Age: 29
End: 2024-05-30
Payer: COMMERCIAL

## 2024-05-30 DIAGNOSIS — M76.51 PATELLAR TENDINITIS OF RIGHT KNEE: ICD-10-CM

## 2024-05-30 DIAGNOSIS — M22.2X1 PATELLOFEMORAL PAIN SYNDROME OF RIGHT KNEE: ICD-10-CM

## 2024-05-30 DIAGNOSIS — M76.891 HAMSTRING TENDINITIS OF RIGHT THIGH: Primary | ICD-10-CM

## 2024-05-30 DIAGNOSIS — M76.891 ADDUCTOR TENDINITIS OF RIGHT HIP: ICD-10-CM

## 2024-05-30 DIAGNOSIS — T14.8XXA BONE BRUISE: ICD-10-CM

## 2024-05-30 PROCEDURE — 99213 OFFICE O/P EST LOW 20 MIN: CPT | Performed by: STUDENT IN AN ORGANIZED HEALTH CARE EDUCATION/TRAINING PROGRAM

## 2024-05-30 ASSESSMENT — ENCOUNTER SYMPTOMS
JOINT SWELLING: 0
COLOR CHANGE: 0
ACTIVITY CHANGE: 0
FEVER: 0
NUMBNESS: 0
BACK PAIN: 0
ARTHRALGIAS: 1
WEAKNESS: 0
FATIGUE: 0

## 2024-05-30 NOTE — PROGRESS NOTES
REFERRAL SOURCE: No ref. provider found     CHIEF COMPLAINT: right knee pain    HISTORY OF PRESENT ILLNESS  Evelina Mason is a very pleasant 28 y.o. female with history of ADHD, Anxiety, and TSERING who is here for evaluation of right knee pain.    Previous History:  11/7/23: On the morning of November 6, 2023 she was out walking her dog when it bolted after a squirrel causing her to fall to the ground.  She fell directly on her knee does not remember if the knee twisted or not as it happened so fast.  She immediately felt pain along with a pop.  She was able to get up and walk on it but could not fully extend the knee.  She tried ice and heat at home which did not really help and the knee swelled had a bruise formed.  She feels like the knee is unstable when she walks around.  Pain is located anteriorly and feels very sharp.  She rates her pain a 4 out of 10 constantly with flares up to 10 out of 10.    12/21/2023: Since the last visit, her pain has improved slightly but still has pain around her right knee anteriorly. This is worse when she flexes her knee and also has to kneel down. She has been trying to rest her knee when she can and avoid activities that would make it worse.     Interval History:  5/30/24: Continues to have pain around the right knee.  Currently, she is a sharp stabbing pain over the distal lateral patellar pole that moves when she flexes and extends her knee.  She has pain with kneeling, even when kneeling on her couch.  She also has some pain over the medial knee region.  She has been trying to rest, but is still doing hot yoga for activity.  She did do 3 weeks of physical therapy without benefit.  She is very frustrated that she continues to have this pain.  Occasionally she will get pain with light touch.  Denies color changes or changes in sweating and hair growth over the region.    MEDS    Current Outpatient Medications:     albuterol 90 mcg/actuation inhaler, Inhale 2 puffs every 6  hours if needed for wheezing., Disp: 18 g, Rfl: 0    amphetamine-dextroamphetamine XR (Adderall XR) 15 mg 24 hr capsule, Take 1 capsule (15 mg) by mouth once daily in the morning. Do not crush or chew., Disp: , Rfl:     clonazePAM (KlonoPIN) 1 mg tablet, Take 1 tablet (1 mg) by mouth every 12 hours if needed for anxiety., Disp: 20 tablet, Rfl: 0    EluRyng 0.12-0.015 mg/24 hr vaginal ring, Insert 1 each into the vagina every 28 (twenty-eight) days., Disp: 3 each, Rfl: 1    fluticasone (Flonase) 50 mcg/actuation nasal spray, Administer 2 sprays into each nostril once daily., Disp: , Rfl:     propranolol (Inderal) 20 mg tablet, Take 1 tablet (20 mg) by mouth in the morning and 1 tablet (20 mg) in the evening and 1 tablet (20 mg) before bedtime. (Patient not taking: Reported on 11/7/2023), Disp: 50 tablet, Rfl: 1    ALLERGIES  Allergies   Allergen Reactions    Bee Venom Protein (Honey Bee) Swelling    Nickel Unknown       PAST MEDICAL HISTORY  Past Medical History:   Diagnosis Date    Arthralgia of temporomandibular joint, unspecified side     TMJ syndrome       PAST SURGICAL HISTORY  Past Surgical History:   Procedure Laterality Date    OTHER SURGICAL HISTORY  05/21/2021    No history of surgery       SOCIAL HISTORY   Social History     Socioeconomic History    Marital status: Single     Spouse name: Not on file    Number of children: Not on file    Years of education: Not on file    Highest education level: Not on file   Occupational History    Not on file   Tobacco Use    Smoking status: Never    Smokeless tobacco: Never   Substance and Sexual Activity    Alcohol use: Yes     Alcohol/week: 2.0 standard drinks of alcohol     Types: 2 Glasses of wine per week     Comment: social    Drug use: Never    Sexual activity: Not on file   Other Topics Concern    Not on file   Social History Narrative    Not on file     Social Determinants of Health     Financial Resource Strain: Not on file   Food Insecurity: Not on file    Transportation Needs: Not on file   Physical Activity: Not on file   Stress: Not on file   Social Connections: Not on file   Intimate Partner Violence: Not on file   Housing Stability: Not on file       FAMILY HISTORY  Mother has Rheumatoid Arthritis and osteoporosis    REVIEW OF SYSTEMS  Except for those mentioned in the history of present illness, and below, a complete review of systems is negative.     Review of Systems   Constitutional:  Negative for activity change, fatigue and fever.   Musculoskeletal:  Positive for arthralgias (right knee pian). Negative for back pain and joint swelling.   Skin:  Negative for color change (ecchymosis over medial knee) and rash.   Neurological:  Negative for weakness and numbness.       VITALS  There were no vitals filed for this visit.    PHYSICAL EXAMINATION   GENERAL:  Awake, alert, and oriented, no apparent distress, pleasant, and cooperative  PSYC: Mood is euthymic, affect is congruent  EAR, NOSE, THROAT:  Normocephalic, atraumatic, moist membranes, anicteric sclera  LUNG: Nonlabored breathing  HEART: No clubbing or cyanosis  SKIN: No increased erythema, warmth, rashes, or concerning skin lesions  NEURO: Sensation is intact in the bilateral lower extremities. Strength is grossly 5 out of 5 throughout the bilateral lower extremities, unless noted below.  GAIT: Antalgic; not able to fully extend the knee.  MUSCULOSKELETAL: Examination of the right knee: Range of motion is 0-140. No effusion. No patellar apprehension.  Tender palpation over the distal adductor and medial hamstring tendons as well as the proximal lateral patellar tendon.  No tenderness palpation of the medial femoral condyle or MCL or medial or lateral joint lines.  Varus and valgus stress test are negative. Lachman's is negative. Posterior drawer is negative. Kolton's and meniscal grind tests are negative.    IMAGING STUDIES:   MRI of the right knee on November 15, 2023 - osseous contusion of the medial  femoral condyle and no findings of meniscal or ligamentous injury.    Radiographs the right knee dated 11/7/2023 - suprapatellar effusion.     DEXA on 4/26/24 showed normal bone density.    IMPRESSION  #1  Osseous contusion of the right medial femoral condyle, improved  #2  Secondary issues related to distal medial hamstring and adductor tendinosis and patellar tendinosis    PLAN  The following was discussed with the patient:     Evelina Mason is a very pleasant 28 y.o. female  with history of ADHD, Anxiety, and TSERING who is here for evaluation of right knee pain.  MRI showed an osseous contusion of the right medial femoral condyle. This is also complicated by patellofemoral pain and distal medial hamstring and adductor tendinosis and patellar tendinosis.  At this point, clinically she seems to be healed from her medial femoral condyle contusion.  -We discussed the above.  -Referral to PT to work on knee flexor and extensor, hip abductor, and core strengthening and flexibility in her knee and hip.  She did do physical therapy previously, but only did it for 3 weeks.  We discussed that she would need a longer timeframe, at least 6 weeks.  -She can do activities as tolerated.  We discussed that she should feel confident that the bone bruise is healed and that it she is unlikely to cause any long-term damage with activity.  She does, understandably, have some anxiety around this.  -Ordered CBC, CMP, CRP, ESR, ALEXANDR, HLA B27 for family history of rheumatoid arthritis and her intermittent joint swelling. If positive, we will refer to rheumatology.  -Can take Tylenol over-the-counter and utilize ice.  -Follow-up in 8-10 weeks, or sooner if needed.    The patient was counseled to remain active, but avoid activities that worsen symptoms. The patient was in agreement with this plan. All questions were answered to the best of my ability.    PATIENT EDUCATION:  Education was discussed at today's appointment. A learning needs  assessment was performed.    Primary learner: Evelina Mason  Barriers to learning: None  Preferred language: English  Learning preferences include: Seeing and doing.  Discussed: Diagnosis and treatment plan.  Demonstrated: Understanding of material discussed.  Patient education materials given: None.  Learner response: Learner demonstrated understanding.    This note was dictated using Dragon speech recognition software and was not corrected for spelling or grammatical errors.

## 2024-08-09 ENCOUNTER — APPOINTMENT (OUTPATIENT)
Dept: ORTHOPEDIC SURGERY | Facility: CLINIC | Age: 29
End: 2024-08-09
Payer: COMMERCIAL